# Patient Record
Sex: FEMALE | Race: ASIAN | Employment: STUDENT | URBAN - METROPOLITAN AREA
[De-identification: names, ages, dates, MRNs, and addresses within clinical notes are randomized per-mention and may not be internally consistent; named-entity substitution may affect disease eponyms.]

---

## 2017-01-09 ENCOUNTER — OFFICE VISIT (OUTPATIENT)
Dept: PSYCHIATRY | Facility: CLINIC | Age: 37
End: 2017-01-09
Attending: NURSE PRACTITIONER
Payer: MEDICARE

## 2017-01-09 VITALS — HEART RATE: 93 BPM | DIASTOLIC BLOOD PRESSURE: 87 MMHG | SYSTOLIC BLOOD PRESSURE: 122 MMHG

## 2017-01-09 DIAGNOSIS — F43.10 PTSD (POST-TRAUMATIC STRESS DISORDER): Primary | ICD-10-CM

## 2017-01-09 PROCEDURE — 99212 OFFICE O/P EST SF 10 MIN: CPT | Mod: ZF
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
## 2017-01-11 ENCOUNTER — TELEPHONE (OUTPATIENT)
Dept: PSYCHIATRY | Facility: CLINIC | Age: 37
End: 2017-01-11

## 2017-01-11 ASSESSMENT — PATIENT HEALTH QUESTIONNAIRE - PHQ9: SUM OF ALL RESPONSES TO PHQ QUESTIONS 1-9: 17

## 2017-01-11 NOTE — TELEPHONE ENCOUNTER
On 01/09/2017 the patient signed an MANOJ authorizing the release of all petinent records from [facility] to [facility] for the purpose of continued care by another provider. On 01/11/2017 I faxed this MANOJ to [phone], sent the original to scanning and kept a copy in psychiatry until scanning completed/confirmed.            Ines Villarreal LPN

## 2017-01-11 NOTE — TELEPHONE ENCOUNTER
On 01/09/2017 the patient signed an MANOJ authorizing the release of all pertinent records from [facility] [place] to [facility] for the purpose of continued care by another provider. On 01/11/2017 I faxed this MANOJ to [phone], sent the original to scanning and kept a copy in psychiatry until scanning completed/confirmed.            Ines Villarreal LPN

## 2017-01-17 RX ORDER — MIRTAZAPINE 7.5 MG/1
TABLET, FILM COATED ORAL
Qty: 15 TABLET | Refills: 0 | Status: SHIPPED | OUTPATIENT
Start: 2017-01-17

## 2017-01-19 ENCOUNTER — TELEPHONE (OUTPATIENT)
Dept: PSYCHIATRY | Facility: CLINIC | Age: 37
End: 2017-01-19

## 2017-01-19 NOTE — TELEPHONE ENCOUNTER
Records received from [facility] via fax on 1/16/2017. Records placed in chart prep for Genevieve Tipton to review. Appointment scheduled for 2/6/2017.Ciera Dent/SHAKILA  .

## 2017-03-13 ENCOUNTER — OFFICE VISIT (OUTPATIENT)
Dept: DERMATOLOGY | Facility: CLINIC | Age: 37
End: 2017-03-13

## 2017-03-13 DIAGNOSIS — L70.0 ACNE VULGARIS: ICD-10-CM

## 2017-03-13 DIAGNOSIS — L85.3 XEROSIS OF SKIN: Primary | ICD-10-CM

## 2017-03-13 ASSESSMENT — PAIN SCALES - GENERAL: PAINLEVEL: NO PAIN (0)

## 2017-03-13 NOTE — PROGRESS NOTES
"[facility] Dermatology Note    Dermatology Problem List:  1. Acne vulgaris  -current treatment: spironolactone 100 mg daily, and tretinoin 0.025% cream qhs  -previous treatments: isotretinoin 80 mg for 4 months (9,600 mg) in 2015, 3% BP wash, and doxycycline     2. Depression   -current treatment: Effexor and Paxil   -asked for recommendations for a new psychiatrist    Encounter Date: Mar 13, 2017    CC:  Chief Complaint   Patient presents with     Derm Problem     Patient comes to clinic today for acne. States \"I think it's gotten better.\"     History of Present Illness:  Ms. Nandini Valentine is a 36 year old female who presents as a follow-up for acne vulgaris. The patient was last seen 11/21/16 when her acne was flared. She is currently treating her acne with spironolactone 100 mg daily, and Retin-A 0.025% cream nightly.  Today, Nandini states that her acne has improved since her last visit. She was using a Retin-a 0.05% cream for a few weeks since she had a previous prescription left over.  However, she found this strength of Retin-A to be very drying and irritating to her skin, and she switched back to the 0.025% strength. Nandini is not currently on any form of birth control at this time, but she denies being sexually active. She is currently washing her face with a Siddharth and Siddharth facial wash.    Nandini states that her depression worsened since her psychiatrist started her on Paxil.  She states that this medication has exacerbated her mood swings and has caused her to gain weight, which has been very distressing for her.  She admits to previously struggling with an eating disorder, and the weight gain since start Paxil has been upsetting for her. She is seeking a new psychiatrist in the [facility], but has had little success. Since last visit, she denies any additional changes in medication or past medical history.     Past Medical History:   Patient Active Problem List   Diagnosis     Acne "     Acne vulgaris     Excoriated acne     Past Medical History   Diagnosis Date     Anxiety 2003     Depression, major 1997     PTSD (post-traumatic stress disorder) 2000     Tinnitus 2000     Past Surgical History   Procedure Laterality Date     Tonsillectomy  2010     Social History:  The patient works from home.  She has a history of depression and an eating disorder.      Family History:  There is no family history of skin cancer.     Medications:  Current Outpatient Prescriptions   Medication Sig Dispense Refill     doxycycline Monohydrate 100 MG CAPS        spironolactone (ALDACTONE) 100 MG tablet Take 1 tablet (100 mg) by mouth daily 30 tablet 4     tretinoin (RETIN-A) 0.025 % cream Use every night 45 g 3     venlafaxine (EFFEXOR-ER) 150 MG TB24 Take 300 mg by mouth daily       Polyethylene Glycol 3350 (MIRALAX PO) Take by mouth daily       mirtazapine (REMERON) 7.5 MG TABS tablet Limit dosing to one half (0.5) tab each night (Patient not taking: Reported on 3/13/2017) 15 tablet 0     zaleplon (SONATA) 10 MG capsule Take 10 mg by mouth At Bedtime Reported on 3/13/2017       Allergies   Allergen Reactions     Wellbutrin [Bupropion Hydrobromide] Rash     Overall body rash     Review of Systems:  -Constitutional: The patient denies fatigue, fevers, chills, unintended weight loss, and night sweats.  -HEENT: Patient denies nonhealing oral sores.  -Skin: As above in HPI. No additional skin concerns.    Physical exam:  GEN: This is a well developed, well-nourished female in no acute distress, in a pleasant mood.    SKIN: Focused examination of the face was performed.  -small ice-pick depressions present along bilateral cheeks and forehead   -scattered erythematous acneiform papules present along bilateral cheeks   -no deep inflamed cystic acne or furuncles present  -No other lesions of concern on areas examined.     Impression/Plan:  1. Acne vulgaris: significantly improved when compared to previous visits. Evidence  of scarring present along bilateral cheeks and a few scattered erythematous papules. No deep cystic acne or furuncles present today.    Continue spironolactone 100 mg daily. Nandini is not currently on any form of contraception, but states that she is not currently sexually active. Instructed Nandini to start a form of birth control if she does become pregnant.     Continue to apply Retin-A 0.025% cream nightly to face.     Recommended restarting a benzoyl peroxide face wash to aid in acne prevention and treatment.     Photographs taken for future reference     2. Depression with history of eating disorder: Nandini endorses poor body image, which is in part due to her acne.  She would like recommendations on a different psychiatrist for management of her depression.    Will ask Chair of Psychiatry for recommendations regarding psychiatrists at the [facility] or the [facility] for management of depression.     Recommended Nandini to continue her current treatment regime with Effexor and Paxil daily.     Follow-up in 4 months, earlier for new or changing lesions.     Staff Involved:  Scribed by Loraine Segovia, MS4 for Dr. Green.      I agree with the PFSH and ROS as completed by the Medical Student. The remainder of the encounter was performed by me and scribed by the Medical Student. The scribed note accurately reflects my personal services and the medical decisions made by me.      Stephania Green MD  Professor and Chair  Department of Dermatology  [facility]

## 2017-03-13 NOTE — NURSING NOTE
"Dermatology Rooming Note    Nandini Valentine's goals for this visit include:   Chief Complaint   Patient presents with     Derm Problem     Patient comes to clinic today for acne. States \"I think it's gotten better.\"     Manisha Pimentel, [facility]    "

## 2017-03-13 NOTE — LETTER
"3/13/2017       RE: Nandini Valentine  [address]   [facility] [address]     Dear Colleague,    Thank you for referring your patient, Nandini Valentine, to the [facility] DERMATOLOGY at [facility]. Please see a copy of my visit note below.    [facility] Dermatology Note    Dermatology Problem List:  1. Acne vulgaris  -current treatment: spironolactone 100 mg daily, and tretinoin 0.025% cream qhs  -previous treatments: isotretinoin 80 mg for 4 months (9,600 mg) in 2015, 3% BP wash, and doxycycline     2. Depression   -current treatment: Effexor and Paxil   -asked for recommendations for a new psychiatrist    Encounter Date: Mar 13, 2017    CC:  Chief Complaint   Patient presents with     Derm Problem     Patient comes to clinic today for acne. States \"I think it's gotten better.\"     History of Present Illness:  Ms. Nandini Valentine is a 36 year old female who presents as a follow-up for acne vulgaris. The patient was last seen 11/21/16 when her acne was flared. She is currently treating her acne with spironolactone 100 mg daily, and Retin-A 0.025% cream nightly.  Today, Nandini states that her acne has improved since her last visit. She was using a Retin-a 0.05% cream for a few weeks since she had a previous prescription left over.  However, she found this strength of Retin-A to be very drying and irritating to her skin, and she switched back to the 0.025% strength. Nandini is not currently on any form of birth control at this time, but she denies being sexually active. She is currently washing her face with a Siddharth and Siddharth facial wash.    Nandini states that her depression worsened since her psychiatrist started her on Paxil.  She states that this medication has exacerbated her mood swings and has caused her to gain weight, which has been very distressing for her.  She admits to previously struggling with an eating disorder, and the weight gain since start Paxil " has been upsetting for her. She is seeking a new psychiatrist in the [facility], but has had little success. Since last visit, she denies any additional changes in medication or past medical history.     Past Medical History:   Patient Active Problem List   Diagnosis     Acne     Acne vulgaris     Excoriated acne     Past Medical History   Diagnosis Date     Anxiety 2003     Depression, major 1997     PTSD (post-traumatic stress disorder) 2000     Tinnitus 2000     Past Surgical History   Procedure Laterality Date     Tonsillectomy  2010     Social History:  The patient works from home.  She has a history of depression and an eating disorder.      Family History:  There is no family history of skin cancer.     Medications:  Current Outpatient Prescriptions   Medication Sig Dispense Refill     doxycycline Monohydrate 100 MG CAPS        spironolactone (ALDACTONE) 100 MG tablet Take 1 tablet (100 mg) by mouth daily 30 tablet 4     tretinoin (RETIN-A) 0.025 % cream Use every night 45 g 3     venlafaxine (EFFEXOR-ER) 150 MG TB24 Take 300 mg by mouth daily       Polyethylene Glycol 3350 (MIRALAX PO) Take by mouth daily       mirtazapine (REMERON) 7.5 MG TABS tablet Limit dosing to one half (0.5) tab each night (Patient not taking: Reported on 3/13/2017) 15 tablet 0     zaleplon (SONATA) 10 MG capsule Take 10 mg by mouth At Bedtime Reported on 3/13/2017       Allergies   Allergen Reactions     Wellbutrin [Bupropion Hydrobromide] Rash     Overall body rash     Review of Systems:  -Constitutional: The patient denies fatigue, fevers, chills, unintended weight loss, and night sweats.  -HEENT: Patient denies nonhealing oral sores.  -Skin: As above in HPI. No additional skin concerns.    Physical exam:  GEN: This is a well developed, well-nourished female in no acute distress, in a pleasant mood.    SKIN: Focused examination of the face was performed.  -small ice-pick depressions present along bilateral cheeks and forehead    -scattered erythematous acneiform papules present along bilateral cheeks   -no deep inflamed cystic acne or furuncles present  -No other lesions of concern on areas examined.     Impression/Plan:  1. Acne vulgaris: significantly improved when compared to previous visits. Evidence of scarring present along bilateral cheeks and a few scattered erythematous papules. No deep cystic acne or furuncles present today.    Continue spironolactone 100 mg daily. Nandini is not currently on any form of contraception, but states that she is not currently sexually active. Instructed Nandini to start a form of birth control if she does become pregnant.     Continue to apply Retin-A 0.025% cream nightly to face.     Recommended restarting a benzoyl peroxide face wash to aid in acne prevention and treatment.     Photographs taken for future reference     2. Depression with history of eating disorder: Nandini endorses poor body image, which is in part due to her acne.  She would like recommendations on a different psychiatrist for management of her depression.    Will ask Chair of Psychiatry for recommendations regarding psychiatrists at the [facility] or the [facility] for management of depression.     Recommended Nandini to continue her current treatment regime with Effexor and Paxil daily.     Follow-up in 4 months, earlier for new or changing lesions.     Staff Involved:  Scribed by Loraine Segovia MS4 for Dr. Green.      I agree with the PFSH and ROS as completed by the Medical Student. The remainder of the encounter was performed by me and scribed by the Medical Student. The scribed note accurately reflects my personal services and the medical decisions made by me.      Stephania Green MD  Professor and Chair  Department of Dermatology  [facility]

## 2017-03-13 NOTE — MR AVS SNAPSHOT
After Visit Summary   3/13/2017    Nandini Valentine    MRN: [id number]           Patient Information     Date Of Birth          1980        Visit Information        Provider Department      3/13/2017 2:05 PM Stephania Green MD [facility] Dermatology        Today's Diagnoses     Xerosis of skin    -  1    Acne vulgaris           Follow-ups after your visit        Who to contact     Please call your clinic at [phone] to:    Ask questions about your health    Make or cancel appointments    Discuss your medicines    Learn about your test results    Speak to your doctor   If you have compliments or concerns about an experience at your clinic, or if you wish to file a complaint, please contact [facility] Relations at [phone] or email us at [email or web address]         Additional Information About Your Visit        MyChart Information     [employer] is an electronic gateway that provides easy, online access to your medical records. With [employer], you can request a clinic appointment, read your test results, renew a prescription or communicate with your care team.     To sign up for [employer] visit the website at [email or web address]   You will be asked to enter the access code listed below, as well as some personal information. Please follow the directions to create your username and password.     Your access code is: [email or web address]  Expires: 2017  6:13 AM     Your access code will  in 90 days. If you need help or a new code, please contact your [facility] or call [phone] for assistance.        Care EveryWhere ID     This is your Care EveryWhere ID. This could be used by other organizations to access your [place] medical records  [id number]         Blood Pressure from Last 3 Encounters:   10/22/12 118/76    Weight from Last 3 Encounters:   02/09/15 53.5 kg (118 lb)   10/22/12 58.5 kg (129 lb)               Today, you had the following     No orders found for display         Where to get your medicines      These medications were sent to [facility] [id number] [facility] - [place], [place] - [address]  [address] [address]     Phone:  [phone]     tretinoin 0.025 % cream          Primary Care Provider Office Phone # Fax #    Neetu Cheema [phone]       [facility] [address]  [place] [address]        Thank you!     Thank you for choosing [facility] DERMATOLOGY  for your care. Our goal is always to provide you with excellent care. Hearing back from our patients is one way we can continue to improve our services. Please take a few minutes to complete the written survey that you may receive in the mail after your visit with us. Thank you!             Your Updated Medication List - Protect others around you: Learn how to safely use, store and throw away your medicines at www.disposemymeds.org.          This list is accurate as of: 3/13/17 11:59 PM.  Always use your most recent med list.                   Brand Name Dispense Instructions for use    doxycycline Monohydrate 100 MG Caps          MIRALAX PO      Take by mouth daily       mirtazapine 7.5 MG Tabs tablet    REMERON    15 tablet    Limit dosing to one half (0.5) tab each night       SONATA 10 MG capsule   Generic drug:  zaleplon      Take 10 mg by mouth At Bedtime Reported on 3/13/2017       spironolactone 100 MG tablet    ALDACTONE    30 tablet    Take 1 tablet (100 mg) by mouth daily       tretinoin 0.025 % cream    RETIN-A    45 g    Use every night       venlafaxine 150 MG Tb24 24 hr tablet    EFFEXOR-ER     Take 300 mg by mouth daily

## 2017-04-17 ENCOUNTER — TELEPHONE (OUTPATIENT)
Dept: PSYCHIATRY | Facility: CLINIC | Age: 37
End: 2017-04-17

## 2017-04-17 NOTE — TELEPHONE ENCOUNTER
Medication Requested: Mirtazapine 7.5 mg tabs  Last Written RX Date: 1-17-17  Last Pharmacy Fill Date: 2-1-17  Last RX Quantity: 15,   # refills: 0    Last Office Visit: 1-9-17  Recommended RTC: 4 wks  Next Scheduled Office Visit: none    Since last Visit: # of CX 1 ,# of NOS 1    Last Visit Recommendations for:  Medications: trial of Mirtazapine  Labs: 0    Will send message to scheduling for an appointment.  Will then route to provider due to cancellation and NOS.      Kathleen M Doege RN

## 2017-04-24 PROBLEM — L85.3 XEROSIS OF SKIN: Status: ACTIVE | Noted: 2017-04-24

## 2017-04-24 RX ORDER — TRETINOIN 0.25 MG/G
CREAM TOPICAL
Qty: 45 G | Refills: 3 | Status: SHIPPED | OUTPATIENT
Start: 2017-04-24

## 2017-04-28 DIAGNOSIS — L70.0 ACNE VULGARIS: ICD-10-CM

## 2017-04-28 RX ORDER — SPIRONOLACTONE 100 MG/1
100 TABLET, FILM COATED ORAL DAILY
Qty: 30 TABLET | Refills: 4 | Status: SHIPPED | OUTPATIENT
Start: 2017-04-28 | End: 2017-09-29

## 2017-04-28 NOTE — TELEPHONE ENCOUNTER
Received refill request for spironolactone as [facility]ANE. Dr. Green's notes reviewed. Patient K wnl, and she has been counseled regarding risk of birth defects. She states she is not sexually active. Refill appropriate, and accepted.    Cinthia Peralta MD  Medicine/Dermatology, PGY-4  [facility]

## 2017-04-28 NOTE — TELEPHONE ENCOUNTER
Last visit 3-13  Recall for 7-17    Impression/Plan:  1. Acne vulgaris: significantly improved when compared to previous visits. Evidence of scarring present along bilateral cheeks and a few scattered erythematous papules. No deep cystic acne or furuncles present today.    Continue spironolactone 100 mg daily. Nandini is not currently on any form of contraception, but states that she is not currently sexually active. Instructed Nandini to start a form of birth control if she does become pregnant.     Continue to apply Retin-A 0.025% cream nightly to face.     Recommended restarting a benzoyl peroxide face wash to aid in acne prevention and treatment.     Photographs taken for future reference      2. Depression with history of eating disorder: Nandini endorses poor body image, which is in part due to her acne. She would like recommendations on a different psychiatrist for management of her depression.    Will ask Chair of Psychiatry for recommendations regarding psychiatrists at the [facility] or the [facility] for management of depression.     Recommended Nandini to continue her current treatment regime with Effexor and Paxil daily.      Follow-up in 4 months, earlier for new or changing lesions.

## 2017-09-27 DIAGNOSIS — L70.0 ACNE VULGARIS: ICD-10-CM

## 2017-09-27 RX ORDER — SPIRONOLACTONE 100 MG/1
100 TABLET, FILM COATED ORAL DAILY
Qty: 30 TABLET | Refills: 4 | OUTPATIENT
Start: 2017-09-27

## 2017-09-29 DIAGNOSIS — L70.0 ACNE VULGARIS: ICD-10-CM

## 2017-09-29 RX ORDER — SPIRONOLACTONE 100 MG/1
100 TABLET, FILM COATED ORAL DAILY
Qty: 30 TABLET | Refills: 4 | Status: SHIPPED | OUTPATIENT
Start: 2017-09-29

## 2017-09-29 NOTE — TELEPHONE ENCOUNTER
Last seen 3/13/17.  Was on the recall list, attempted to call pt no answer, message was left    Impression/Plan:  1. Acne vulgaris: significantly improved when compared to previous visits. Evidence of scarring present along bilateral cheeks and a few scattered erythematous papules. No deep cystic acne or furuncles present today.    Continue spironolactone 100 mg daily. Nandini is not currently on any form of contraception, but states that she is not currently sexually active. Instructed Nandini to start a form of birth control if she does become pregnant.     Continue to apply Retin-A 0.025% cream nightly to face.     Recommended restarting a benzoyl peroxide face wash to aid in acne prevention and treatment.     Photographs taken for future reference      2. Depression with history of eating disorder: Nandini endorses poor body image, which is in part due to her acne.  She would like recommendations on a different psychiatrist for management of her depression.    Will ask Chair of Psychiatry for recommendations regarding psychiatrists at the [facility] or the [facility] for management of depression.     Recommended Nandini to continue her current treatment regime with Effexor and Paxil daily.      Follow-up in 4 months, earlier for new or changing lesions.

## 2017-09-29 NOTE — TELEPHONE ENCOUNTER
Patient needs appointment for further refills; she is on the recall list and attempts have been made to contact her for scheduling.

## 2019-01-29 DIAGNOSIS — L70.0 ACNE VULGARIS: ICD-10-CM

## 2019-01-31 RX ORDER — TRETINOIN 0.25 MG/G
CREAM TOPICAL
Qty: 45 G | Refills: 1 | OUTPATIENT
Start: 2019-01-31

## 2019-01-31 NOTE — TELEPHONE ENCOUNTER
Last Clinic Visit:  3/13/17  NV: NONE   *NEEDS APPT  Scheduling has been notified to contact the pt for appointment.

## 2021-02-26 ENCOUNTER — APPOINTMENT (OUTPATIENT)
Dept: ULTRASOUND IMAGING | Facility: CLINIC | Age: 41
End: 2021-02-26
Attending: EMERGENCY MEDICINE
Payer: MEDICARE

## 2021-02-26 ENCOUNTER — HOSPITAL ENCOUNTER (EMERGENCY)
Facility: CLINIC | Age: 41
Discharge: HOME | End: 2021-02-26
Attending: EMERGENCY MEDICINE | Admitting: EMERGENCY MEDICINE
Payer: MEDICARE

## 2021-02-26 ENCOUNTER — APPOINTMENT (OUTPATIENT)
Dept: CT IMAGING | Facility: CLINIC | Age: 41
End: 2021-02-26
Attending: EMERGENCY MEDICINE
Payer: MEDICARE

## 2021-02-26 VITALS
OXYGEN SATURATION: 97 % | HEART RATE: 69 BPM | TEMPERATURE: 98.2 F | DIASTOLIC BLOOD PRESSURE: 81 MMHG | RESPIRATION RATE: 16 BRPM | SYSTOLIC BLOOD PRESSURE: 113 MMHG

## 2021-02-26 DIAGNOSIS — R10.31 ABDOMINAL PAIN, RIGHT LOWER QUADRANT: ICD-10-CM

## 2021-02-26 LAB
ALBUMIN SERPL-MCNC: 3.5 G/DL (ref 3.4–5)
ALBUMIN UR-MCNC: NEGATIVE MG/DL
ALP SERPL-CCNC: 59 U/L (ref 40–150)
ALT SERPL W P-5'-P-CCNC: 18 U/L (ref 0–50)
ANION GAP SERPL CALCULATED.3IONS-SCNC: 5 MMOL/L (ref 3–14)
APPEARANCE UR: CLEAR
AST SERPL W P-5'-P-CCNC: 14 U/L (ref 0–45)
BASOPHILS # BLD AUTO: 0 10E9/L (ref 0–0.2)
BASOPHILS NFR BLD AUTO: 0.5 %
BILIRUB SERPL-MCNC: 0.3 MG/DL (ref 0.2–1.3)
BILIRUB UR QL STRIP: NEGATIVE
BUN SERPL-MCNC: 10 MG/DL (ref 7–30)
CALCIUM SERPL-MCNC: 8.9 MG/DL (ref 8.5–10.1)
CHLORIDE SERPL-SCNC: 109 MMOL/L (ref 94–109)
CO2 SERPL-SCNC: 25 MMOL/L (ref 20–32)
COLOR UR AUTO: NORMAL
CREAT SERPL-MCNC: 0.8 MG/DL (ref 0.52–1.04)
DIFFERENTIAL METHOD BLD: ABNORMAL
EOSINOPHIL # BLD AUTO: 0.1 10E9/L (ref 0–0.7)
EOSINOPHIL NFR BLD AUTO: 1.1 %
ERYTHROCYTE [DISTWIDTH] IN BLOOD BY AUTOMATED COUNT: 13.9 % (ref 10–15)
GFR SERPL CREATININE-BSD FRML MDRD: >90 ML/MIN/{1.73_M2}
GLUCOSE SERPL-MCNC: 108 MG/DL (ref 70–99)
GLUCOSE UR STRIP-MCNC: NEGATIVE MG/DL
HCG UR QL: NEGATIVE
HCT VFR BLD AUTO: 38 % (ref 35–47)
HGB BLD-MCNC: 11.7 G/DL (ref 11.7–15.7)
HGB UR QL STRIP: NEGATIVE
IMM GRANULOCYTES # BLD: 0 10E9/L (ref 0–0.4)
IMM GRANULOCYTES NFR BLD: 0.2 %
KETONES UR STRIP-MCNC: NEGATIVE MG/DL
LEUKOCYTE ESTERASE UR QL STRIP: NEGATIVE
LYMPHOCYTES # BLD AUTO: 1.9 10E9/L (ref 0.8–5.3)
LYMPHOCYTES NFR BLD AUTO: 29.4 %
MCH RBC QN AUTO: 27.2 PG (ref 26.5–33)
MCHC RBC AUTO-ENTMCNC: 30.8 G/DL (ref 31.5–36.5)
MCV RBC AUTO: 88 FL (ref 78–100)
MONOCYTES # BLD AUTO: 0.4 10E9/L (ref 0–1.3)
MONOCYTES NFR BLD AUTO: 6.1 %
NEUTROPHILS # BLD AUTO: 4 10E9/L (ref 1.6–8.3)
NEUTROPHILS NFR BLD AUTO: 62.7 %
NITRATE UR QL: NEGATIVE
NRBC # BLD AUTO: 0 10*3/UL
NRBC BLD AUTO-RTO: 0 /100
PH UR STRIP: 5.5 PH (ref 5–7)
PLATELET # BLD AUTO: 276 10E9/L (ref 150–450)
POTASSIUM SERPL-SCNC: 3.8 MMOL/L (ref 3.4–5.3)
PROT SERPL-MCNC: 6.6 G/DL (ref 6.8–8.8)
RBC # BLD AUTO: 4.3 10E12/L (ref 3.8–5.2)
SODIUM SERPL-SCNC: 139 MMOL/L (ref 133–144)
SOURCE: NORMAL
SP GR UR STRIP: 1.01 (ref 1–1.03)
UROBILINOGEN UR STRIP-MCNC: NORMAL MG/DL (ref 0–2)
WBC # BLD AUTO: 6.4 10E9/L (ref 4–11)

## 2021-02-26 PROCEDURE — 76830 TRANSVAGINAL US NON-OB: CPT | Mod: 26 | Performed by: RADIOLOGY

## 2021-02-26 PROCEDURE — 99285 EMERGENCY DEPT VISIT HI MDM: CPT | Performed by: EMERGENCY MEDICINE

## 2021-02-26 PROCEDURE — 76830 TRANSVAGINAL US NON-OB: CPT

## 2021-02-26 PROCEDURE — 81003 URINALYSIS AUTO W/O SCOPE: CPT | Performed by: EMERGENCY MEDICINE

## 2021-02-26 PROCEDURE — 85025 COMPLETE CBC W/AUTO DIFF WBC: CPT | Performed by: EMERGENCY MEDICINE

## 2021-02-26 PROCEDURE — 250N000011 HC RX IP 250 OP 636: Performed by: EMERGENCY MEDICINE

## 2021-02-26 PROCEDURE — 76856 US EXAM PELVIC COMPLETE: CPT | Mod: 26 | Performed by: RADIOLOGY

## 2021-02-26 PROCEDURE — G1004 CDSM NDSC: HCPCS | Mod: GC | Performed by: RADIOLOGY

## 2021-02-26 PROCEDURE — 258N000003 HC RX IP 258 OP 636: Performed by: EMERGENCY MEDICINE

## 2021-02-26 PROCEDURE — 99285 EMERGENCY DEPT VISIT HI MDM: CPT | Mod: 25 | Performed by: EMERGENCY MEDICINE

## 2021-02-26 PROCEDURE — 81025 URINE PREGNANCY TEST: CPT | Performed by: EMERGENCY MEDICINE

## 2021-02-26 PROCEDURE — G1004 CDSM NDSC: HCPCS

## 2021-02-26 PROCEDURE — 74177 CT ABD & PELVIS W/CONTRAST: CPT | Mod: 26 | Performed by: RADIOLOGY

## 2021-02-26 PROCEDURE — 80053 COMPREHEN METABOLIC PANEL: CPT | Performed by: EMERGENCY MEDICINE

## 2021-02-26 PROCEDURE — 96360 HYDRATION IV INFUSION INIT: CPT | Mod: 59 | Performed by: EMERGENCY MEDICINE

## 2021-02-26 PROCEDURE — 96361 HYDRATE IV INFUSION ADD-ON: CPT | Performed by: EMERGENCY MEDICINE

## 2021-02-26 RX ORDER — IOPAMIDOL 755 MG/ML
73 INJECTION, SOLUTION INTRAVASCULAR ONCE
Status: COMPLETED | OUTPATIENT
Start: 2021-02-26 | End: 2021-02-26

## 2021-02-26 RX ORDER — SODIUM CHLORIDE, SODIUM LACTATE, POTASSIUM CHLORIDE, CALCIUM CHLORIDE 600; 310; 30; 20 MG/100ML; MG/100ML; MG/100ML; MG/100ML
INJECTION, SOLUTION INTRAVENOUS ONCE
Status: COMPLETED | OUTPATIENT
Start: 2021-02-26 | End: 2021-02-26

## 2021-02-26 RX ADMIN — SODIUM CHLORIDE, POTASSIUM CHLORIDE, SODIUM LACTATE AND CALCIUM CHLORIDE: 600; 310; 30; 20 INJECTION, SOLUTION INTRAVENOUS at 18:23

## 2021-02-26 RX ADMIN — IOPAMIDOL 73 ML: 755 INJECTION, SOLUTION INTRAVENOUS at 18:50

## 2021-02-26 NOTE — ED PROVIDER NOTES
ED Provider Note  [facility]      History     Chief Complaint   Patient presents with     Abdominal Pain     The history is provided by the patient and medical records.     Nandini Valentine is a 40 year old female generally healthy with PMH notable for PTSD, depression, NELLI 3 per Care Everywhere, depression, anxiety, migraines, presenting with about a 1 week history of RLQ abdominal pain.      Patient reports she first noticed a small twinge of pain in the RLQ about 2 weeks ago.  It was short-lived and self resolved.  Then starting around last Friday, she has had progressive development of worsening RLQ pain that now radiates somewhat towards her back in the right low back/flank area.  Pain is localized to that area, nothing makes better or worse.  Breathing only somewhat straight through the back as mentioned without any other radiation.  No other alleviating or aggravating factors.  Associated, which is noticing that she generally is not quite her normal self but no specific symptoms.  No fevers or chills.  No nausea or vomiting.  No changes to bowel or bladder habits.  She previously had a history she with chronic constipation (says she was even on MiraLAX for a year straight at one point), and started MiraLAX within the last couple weeks again, but has not had any concerning change in her bowel habits.  No blood or melena.  No urinary symptoms, no bleeding, no/hematuria, no polyuria or dysuria.  No vaginal bleeding or discharge.  Denies any chance of pregnancy.  Says she is on her the second day of her current menstrual period.  No previous history of  issues other than a previous NELLI 3.  She had noted a family history of appendicitis in the past, she personally has not had any appendicitis to her knowledge.  Denies any history of kidney stones. Reports that she did have a UTI a couple of weeks ago but felt as though that improved after antibiotics.  Denies any chest pain or breathing  symptoms.  No lightheadedness, dizziness, syncope or near syncope.  No known ill contacts.  No other new symptoms or complaints at this time. Please see ROS for further details.      This is the patient's third visit for said symptoms. She reports that she has been seen in urgent care and primary care for such.  She reports that she had a pelvic exam done and was told that her pelvic exam was within normal limits.  She says she did not have a pelvic ultrasound.  She did have a complete abdominal ultrasound which reportedly showed some gallbladder polyps with versus focal sludge but without gallstones or gallbladder wall thickening (they did recommend follow-up ultrasound in 12 months, I discussed this with the patient in the ED today to ensure that she has that follow-up appointment).  Also they noted a fatty liver without mass lesion (also discussed this with the patient and recommend that she follow-up with PCP for such) Blood test from [facility] on 2/22 shows that she had a normal creatinine, Hgb was 11.7 (down from her previous ranges at the 12's) and she had no leukocytosis.  LFTs were WNL. UA on 2/21 had 0-3 RBCs, 0-5 WBCs, occasional bacteria, negative leukocyte esterase, negative nitrites TSH was normal on 2/5.     US of Abdomen 2/22/2021  1.  Small gallbladder polyps versus focal sludge measuring up to 3 mm. No gallstones or gallbladder wall thickening. Recommend followup ultrasound in 12 months. 2.  Fatty liver without mass lesion.       (Reivewed these results w/ patient, and she agrees to F/U w/ PCP for further evaluation, repeat imaging, etc.)      Past Medical History  Past Medical History:   Diagnosis Date     Anxiety 2003     Depression, major 1997     PTSD (post-traumatic stress disorder) 2000     Tinnitus 2000     Past Surgical History:   Procedure Laterality Date     TONSILLECTOMY  2010     doxycycline Monohydrate 100 MG CAPS  mirtazapine (REMERON) 7.5 MG TABS tablet  Polyethylene Glycol  3350 (MIRALAX PO)  spironolactone (ALDACTONE) 100 MG tablet  tretinoin (RETIN-A) 0.025 % cream  venlafaxine (EFFEXOR-ER) 150 MG TB24  zaleplon (SONATA) 10 MG capsule      Allergies   Allergen Reactions     Wellbutrin [Bupropion Hydrobromide] Rash     Overall body rash     Family History  Family History   Problem Relation Age of Onset     Cancer No family hx of         no skin cancer     Social History   Social History     Tobacco Use     Smoking status: Never Smoker     Smokeless tobacco: Never Used   Substance Use Topics     Alcohol use: Yes     Comment: Socially, once per month     Drug use: No      Past medical history, past surgical history, medications, allergies, family history, and social history were reviewed with the patient. No additional pertinent items.       REVIEW OF SYSTEMS  - Constitutional: Negative for chills and fever.   - HENT: Negative.   - Eyes: Negative.   - Respiratory: Negative for shortness of breath.   - Cardiovascular: Negative for chest pain and palpitations.   - Gastrointestinal: Positive for abdominal pain. Negative for blood in stool, constipation, diarrhea, nausea and vomiting.   - Endocrine: Negative for polyuria.   - Genitourinary: Positive for vaginal bleeding (currently on menstrual period, not abnormal for patient). Negative for difficulty urinating, dysuria, frequency, hematuria, menstrual problem (Currently on period), pelvic pain, urgency, vaginal discharge and vaginal pain.   - Musculoskeletal: Negative for back pain (none outside of radiation to some degree from the right abd pain, no midline discomfort), neck pain and neck stiffness.   - Skin: Negative.   - Allergic/Immunologic: Negative for immunocompromised state.   - Neurological: Negative for dizziness, syncope, weakness, light-headedness, numbness and headaches.   - Psychiatric/Behavioral: Negative.   All other systems reviewed and are negative.      Physical Exam   BP: 116/74  Pulse: 76  Temp: 98.2  F (36.8   C)  Resp: 16  SpO2: 100 %  Physical Exam  CONSTITUTIONAL: Well-developed and well-nourished. Awake and alert. Non-toxic appearance. No acute distress.   HENT:   - Head: Normocephalic and atraumatic.   - Ears: Hearing and external ear grossly normal.   - Nose: Nose normal. No rhinorrhea. No epistaxis.   - Mouth/Throat: MMM  EYES: Conjunctivae and lids are normal. No scleral icterus.   NECK: Normal range of motion and phonation normal. Neck supple.  No tracheal deviation, no stridor. No edema or erythema noted.  CARDIOVASCULAR: Normal rate, regular rhythm and no appreciable abnormal heart sounds.  PULMONARY/CHEST: Normal work of breathing. No accessory muscle usage or stridor. No respiratory distress.  No appreciable abnormal breath sounds.  ABDOMEN:  She does have discomfort to palpation in the RLQ, but without jaquan peritoneal findings.  No palpable masses or abnormal pulsatility appreciated.  No particular back/flank symptoms appreciated on exam.  MUSCULOSKELETAL: Extremities warm and seemingly well perfused. No edema or calf tenderness. No back findings.   NEUROLOGIC: Awake, alert. Not disoriented. She displays no atrophy and no tremor. Normal tone. No seizure activity. GCS 15  SKIN: Skin is warm and dry. No rash noted. No diaphoresis. No pallor.   PSYCHIATRIC: Normal mood and affect. Speech and behavior normal. Thought processes linear. Cognition and memory are normal.       Assessments & Plan (with Medical Decision Making)   IMPRESSION:  40-year-old female, PMH notable for migraines, PTSD, depression, anxiety, acne, with a family history of appendicitis, and with a relative recent UTI (treated with resolution of symptoms) and restarting previous MiraLAX, presenting with progressive RLQ abdominal pain over the last week as described further above. Clinically, patient appears nontoxic, NAD.  Vitals grossly WNL.  Otherwise on examination, she does have some RLQ discomfort to palpation, but without jaquan peritoneal  "findings, no palpable masses.  No other acute findings on exam.     DDx includes, but not limited to, kidney stone, pyelonephritis or other urologic cause, appendicitis, enteritis, mesenteric adenitis, epiploic appendagitis,  issues such as ovarian cyst, less likely torsion, pregnancy test already returned negative and think unlikely to be ectopic or pregnancy complication.  MSK pain, no skin findings for shingles, amongst others. Pain seems too low to be gastritis/PUD, hepatobiliary cause, no obvious clear for pancreatitis, does not seem to be an occult thoracic cause, amongst others.     PLAN: Laboratory studies, urine studies, will escalate to CT imaging, though may also need a pelvic ultrasound if that is negative (all discussed with patient, to which she agreed)  - Patient declining symptom management at this time, will continue to monitor.   - Risks/benefits of pursuing imaging reviewed and accepted.      RESULTS:  - Labs: Labs no leukocytosis, Hgb 11.7 (similar to most recent), no acute findings on CMP outside of slightly low total protein, slightly elevated glucose but not significantly so, normal anion gap  - Urine: Urine pregnancy negative, UA unremarkable  - Imaging: Written preliminary reports reviewed:  --- CT A/P: \"No acute findings within the abdomen or pelvis. No findings to explain  the patient's abdominal pain.\"  --- Pelvic US: \"Normal pelvic ultrasound.\"  --- Results/reports reviewed w/ patient who expresses understanding of findings and F/U recommendations.     INTERVENTIONS:   - Pt continued to decline.      RE-EVALUATION:  - Pt doing well. Says she just wanted to make sure she didn't have appendicitis given her family members having such previously and the location of her discomfort.   - Otherwise continues to do well here in the ED, no acute issues or apparent concerning changes in vitals or clinical appearance.     DISCUSSIONS:  - w/ Patient: I have reviewed the available findings, exact " cause of symptoms not yet know, and so reviewed importance of close F/U (in addition to needing F/U as per previous US), and return precautions.  - Reviewed plan, reiterated need for close follow up, strict return/safety instructions with the patient, additional/repeat testing, etc.. She expressed understanding and agreement with this plan. All questions answered to the best of our ability at this time.      DISPOSITION/PLANNING:  - IMPRESSION: RLQ Abdominal pain  - DISPOSITION: Discharge to home  - FOLLOW-UP: PCP within a couple days  - RECOMMENDATIONS: Conservative symptom management, strict return instructions  ______________________________________________________________________  This part of the medical record was transcribed by Lucy Rdz, Medical Scribe, from a dictation done by Jeanine Worthington MD.     --  Jeanine Worthington MD  [facility] EMERGENCY DEPARTMENT  2/26/2021     Jeanine Worthington MD  02/28/21 0041

## 2021-02-26 NOTE — ED TRIAGE NOTES
Right lower abdominal pain that started last Friday. Went to urgent care Sunday, did UA, which was normal. Abdominal US completed on Tuesday, which was also reportedly normal. Pain continues to increase, nurse triage line  Instructed her to come to ER for evaluation.

## 2021-02-27 NOTE — DISCHARGE INSTRUCTIONS
TODAY'S VISIT:  You were seen today for abdominal pain.   - No obvious emergent causes for your pain were found on your urine, blood, or imaging tests.    - That said, the cause of your symptoms is not yet known is very important that you follow-up closely with your usual providers within the next couple of days, and you should return to the nearest Emergency Department with any new or worsening symptoms or any concerns.  - You should discuss all imaging/radiology tests and laboratory tests that were performed during this visit with your usual providers to ensure you continue to improve and do not need any further evaluation, testing or management.   - Please call your Primary Care team to discuss and arrange a follow-up appointment.     FOLLOW-UP:  Please make an appointment to follow up with:  - Your Primary Care Provider as soon as possible. Call their clinic in the morning to schedule a follow-up in the next couple of days. (If they're note open in the morning, please call first thing Monday morning).   - If you do not have a primary care provider, you can be seen in follow-up and establish care with one of our providers by calling of the the clinics below:  --- [facility] (phone: [phone])  --- [facility] (phone: [phone])   - Have your provider review the results from today's visit with you again to make sure no further follow-up or additional testing is needed based on those results.     OTHER INSTRUCTIONS:  - Do your best to stay hydrated.    RETURN TO THE EMERGENCY DEPARTMENT  Return to the Emergency Department immediately for any new or worsening symptoms or any concerns.     Remember that you can always come back to the Emergency Department if you are not able to see your regular doctor in the amount of time listed above, if you get any new symptoms, or if there is anything that worries you.